# Patient Record
Sex: FEMALE | HISPANIC OR LATINO | Employment: FULL TIME | URBAN - METROPOLITAN AREA
[De-identification: names, ages, dates, MRNs, and addresses within clinical notes are randomized per-mention and may not be internally consistent; named-entity substitution may affect disease eponyms.]

---
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
## 2021-06-13 ENCOUNTER — HOSPITAL ENCOUNTER (OUTPATIENT)
Facility: MEDICAL CENTER | Age: 21
End: 2021-06-13
Attending: FAMILY MEDICINE
Payer: COMMERCIAL

## 2021-06-13 ENCOUNTER — OFFICE VISIT (OUTPATIENT)
Dept: URGENT CARE | Facility: PHYSICIAN GROUP | Age: 21
End: 2021-06-13
Payer: COMMERCIAL

## 2021-06-13 VITALS
BODY MASS INDEX: 20.77 KG/M2 | OXYGEN SATURATION: 100 % | TEMPERATURE: 98 F | HEIGHT: 61 IN | WEIGHT: 110 LBS | HEART RATE: 98 BPM | SYSTOLIC BLOOD PRESSURE: 102 MMHG | DIASTOLIC BLOOD PRESSURE: 58 MMHG | RESPIRATION RATE: 16 BRPM

## 2021-06-13 DIAGNOSIS — R43.9 DECREASED TASTE AND SMELL: ICD-10-CM

## 2021-06-13 DIAGNOSIS — Z20.822 EXPOSURE TO CONFIRMED CASE OF COVID-19: ICD-10-CM

## 2021-06-13 DIAGNOSIS — R05.9 COUGH: ICD-10-CM

## 2021-06-13 DIAGNOSIS — J34.89 RHINORRHEA: ICD-10-CM

## 2021-06-13 DIAGNOSIS — R50.9 FEVER, UNSPECIFIED FEVER CAUSE: ICD-10-CM

## 2021-06-13 DIAGNOSIS — J02.9 ACUTE PHARYNGITIS, UNSPECIFIED ETIOLOGY: ICD-10-CM

## 2021-06-13 DIAGNOSIS — Z20.822 SUSPECTED COVID-19 VIRUS INFECTION: ICD-10-CM

## 2021-06-13 LAB
INT CON NEG: NORMAL
INT CON POS: NORMAL
S PYO AG THROAT QL: NEGATIVE

## 2021-06-13 PROCEDURE — U0005 INFEC AGEN DETEC AMPLI PROBE: HCPCS

## 2021-06-13 PROCEDURE — U0003 INFECTIOUS AGENT DETECTION BY NUCLEIC ACID (DNA OR RNA); SEVERE ACUTE RESPIRATORY SYNDROME CORONAVIRUS 2 (SARS-COV-2) (CORONAVIRUS DISEASE [COVID-19]), AMPLIFIED PROBE TECHNIQUE, MAKING USE OF HIGH THROUGHPUT TECHNOLOGIES AS DESCRIBED BY CMS-2020-01-R: HCPCS

## 2021-06-13 PROCEDURE — 87880 STREP A ASSAY W/OPTIC: CPT | Performed by: FAMILY MEDICINE

## 2021-06-13 PROCEDURE — 99213 OFFICE O/P EST LOW 20 MIN: CPT | Mod: CS | Performed by: FAMILY MEDICINE

## 2021-06-13 NOTE — PROGRESS NOTES
Chief Complaint:    Chief Complaint   Patient presents with   • Other     lost of taste and smell        History of Present Illness:    Symptoms since 6/10/21. She has had subjective fever, rhinorrhea, sore throat, and non-productive cough. Today, started with loss of taste and smell. Roommate found out today he is positive for COVID-19 - he was swabbed 2 days prior. She used OTC meds for symptoms.      Past Medical History:    Past Medical History:   Diagnosis Date   • Heart murmur      Past Surgical History:    No past surgical history on file.    Social History:    Social History     Socioeconomic History   • Marital status: Single     Spouse name: Not on file   • Number of children: Not on file   • Years of education: Not on file   • Highest education level: Not on file   Occupational History   • Not on file   Tobacco Use   • Smoking status: Never Smoker   • Smokeless tobacco: Never Used   Vaping Use   • Vaping Use: Never used   Substance and Sexual Activity   • Alcohol use: No   • Drug use: No   • Sexual activity: Yes     Partners: Male     Birth control/protection: Condom   Other Topics Concern   • Behavioral problems Not Asked   • Interpersonal relationships Not Asked   • Sad or not enjoying activities Not Asked   • Suicidal thoughts Not Asked   • Poor school performance Not Asked   • Reading difficulties Not Asked   • Speech difficulties Not Asked   • Writing difficulties Not Asked   • Inadequate sleep Not Asked   • Excessive TV viewing Not Asked   • Excessive video game use Not Asked   • Inadequate exercise Not Asked   • Sports related Not Asked   • Poor diet Not Asked   • Family concerns for drug/alcohol abuse Not Asked   • Poor oral hygiene Not Asked   • Bike safety Not Asked   • Family concerns vehicle safety Not Asked   Social History Narrative   • Not on file     Social Determinants of Health     Financial Resource Strain:    • Difficulty of Paying Living Expenses:    Food Insecurity:    • Worried  "About Running Out of Food in the Last Year:    • Ran Out of Food in the Last Year:    Transportation Needs:    • Lack of Transportation (Medical):    • Lack of Transportation (Non-Medical):    Physical Activity:    • Days of Exercise per Week:    • Minutes of Exercise per Session:    Stress:    • Feeling of Stress :    Social Connections:    • Frequency of Communication with Friends and Family:    • Frequency of Social Gatherings with Friends and Family:    • Attends Denominational Services:    • Active Member of Clubs or Organizations:    • Attends Club or Organization Meetings:    • Marital Status:    Intimate Partner Violence:    • Fear of Current or Ex-Partner:    • Emotionally Abused:    • Physically Abused:    • Sexually Abused:      Family History:    Family History   Problem Relation Age of Onset   • No Known Problems Mother    • No Known Problems Brother    • Diabetes Maternal Uncle    • Diabetes Maternal Grandmother      Medications:    No current outpatient medications on file prior to visit.     No current facility-administered medications on file prior to visit.     Allergies:    No Known Allergies      Vitals:    Vitals:    06/13/21 1237   BP: 102/58   Pulse: 98   Resp: 16   Temp: 36.7 °C (98 °F)   TempSrc: Temporal   SpO2: 100%   Weight: 49.9 kg (110 lb)   Height: 1.549 m (5' 1\")       Physical Exam:    Constitutional: Vital signs reviewed. Appears well-developed and well-nourished. No acute distress.   Eyes: Sclera white, conjunctivae clear.   ENT: External ears normal. External auditory canals normal without discharge. TMs translucent and non-bulging. Hearing normal. Nasal mucosa erythematous. Lips/teeth are normal. Oral mucosa pink and moist. Posterior pharynx: mildly erythematous.  Neck: Neck supple.   Cardiovascular: Regular rate and rhythm. No murmur.  Pulmonary/Chest: Respirations non-labored. Clear to auscultation bilaterally.  Lymph: Cervical nodes without tenderness or " enlargement.  Musculoskeletal: Normal gait. Normal range of motion. No muscular atrophy or weakness.  Neurological: Alert and oriented to person, place, and time. Muscle tone normal. Coordination normal.   Skin: No rashes or lesions. Warm, dry, normal turgor.  Psychiatric: Normal mood and affect. Behavior is normal. Judgment and thought content normal.       Diagnostics:    POCT Rapid Strep A  Order: [id number]  Status:  Final result   Visible to patient:  No (scheduled for 6/14/2021 11:04 AM) Next appt:  None Dx:  Acute pharyngitis, unspecified etiology   0 Result Notes  Component  1:03 PM   Rapid Strep Screen negative    Internal Control Positive Valid    Internal Control Negative Valid    Resulting [facility]         Specimen Collected: 06/13/21  1:03 PM Last Resulted: 06/13/21  1:04 PM             Assessment / Plan:    1. Fever, unspecified fever cause  - SARS-CoV-2 PCR (24 hour In-House): Collect NP swab in VTM; Future    2. Rhinorrhea  - SARS-CoV-2 PCR (24 hour In-House): Collect NP swab in VTM; Future    3. Acute pharyngitis, unspecified etiology  - SARS-CoV-2 PCR (24 hour In-House): Collect NP swab in VTM; Future  - POCT Rapid Strep A    4. Decreased taste and smell  - SARS-CoV-2 PCR (24 hour In-House): Collect NP swab in VTM; Future    5. Cough  - SARS-CoV-2 PCR (24 hour In-House): Collect NP swab in VTM; Future    6. Suspected COVID-19 virus infection  - SARS-CoV-2 PCR (24 hour In-House): Collect NP swab in VTM; Future    7. Exposure to confirmed case of COVID-19  - SARS-CoV-2 PCR (24 hour In-House): Collect NP swab in VTM; Future      Discussed with her DDX, management options, and risks, benefits, and alternatives to treatment plan agreed upon.    Patient is clinically stable.    May use over-the-counter medications (such as cold meds) for symptoms as needed.    Agreeable to COVID-19 test obtained.    Advised test result will show in MyChart.    Patient will follow-up if needed while waiting for  test result.

## 2021-06-14 DIAGNOSIS — J02.9 ACUTE PHARYNGITIS, UNSPECIFIED ETIOLOGY: ICD-10-CM

## 2021-06-14 DIAGNOSIS — R43.9 DECREASED TASTE AND SMELL: ICD-10-CM

## 2021-06-14 DIAGNOSIS — J34.89 RHINORRHEA: ICD-10-CM

## 2021-06-14 DIAGNOSIS — Z20.822 SUSPECTED COVID-19 VIRUS INFECTION: ICD-10-CM

## 2021-06-14 DIAGNOSIS — Z20.822 EXPOSURE TO CONFIRMED CASE OF COVID-19: ICD-10-CM

## 2021-06-14 DIAGNOSIS — R05.9 COUGH: ICD-10-CM

## 2021-06-14 DIAGNOSIS — R50.9 FEVER, UNSPECIFIED FEVER CAUSE: ICD-10-CM

## 2021-06-14 LAB — COVID ORDER STATUS COVID19: NORMAL

## 2021-06-15 LAB
SARS-COV-2 RNA RESP QL NAA+PROBE: DETECTED
SPECIMEN SOURCE: ABNORMAL

## 2023-02-21 ENCOUNTER — HOSPITAL ENCOUNTER (EMERGENCY)
Facility: MEDICAL CENTER | Age: 23
End: 2023-02-21

## 2023-02-21 VITALS
OXYGEN SATURATION: 95 % | WEIGHT: 99.65 LBS | RESPIRATION RATE: 16 BRPM | SYSTOLIC BLOOD PRESSURE: 128 MMHG | HEIGHT: 61 IN | BODY MASS INDEX: 18.81 KG/M2 | DIASTOLIC BLOOD PRESSURE: 76 MMHG | TEMPERATURE: 98.6 F | HEART RATE: 77 BPM

## 2023-02-21 PROCEDURE — 302449 STATCHG TRIAGE ONLY (STATISTIC)

## 2023-02-22 NOTE — ED TRIAGE NOTES
Lo Mendoza  22 y.o.  Chief Complaint   Patient presents with    Abdominal Pain     lower     Pt ambulatory to triage with c/o low abdominal pain.  Pt reports she had this pain 6 months ago and it went away without being seen.  Pt reports she had some vaginal bleeding today and then the abdominal cramping.  Pt reports she is not on borth control, but she doesn't think she is pregnant.  Pt also reports she recently had an .  Pt rates her pain 3-4/10 at this time, but states the pain has been as high as a 9/10.

## 2024-08-14 ENCOUNTER — GYNECOLOGY VISIT (OUTPATIENT)
Dept: OBGYN | Facility: CLINIC | Age: 24
End: 2024-08-14
Payer: COMMERCIAL

## 2024-08-14 ENCOUNTER — HOSPITAL ENCOUNTER (OUTPATIENT)
Facility: MEDICAL CENTER | Age: 24
End: 2024-08-14
Attending: STUDENT IN AN ORGANIZED HEALTH CARE EDUCATION/TRAINING PROGRAM
Payer: COMMERCIAL

## 2024-08-14 VITALS
HEIGHT: 61 IN | SYSTOLIC BLOOD PRESSURE: 137 MMHG | DIASTOLIC BLOOD PRESSURE: 83 MMHG | BODY MASS INDEX: 18.88 KG/M2 | WEIGHT: 100 LBS

## 2024-08-14 DIAGNOSIS — Z30.09 BIRTH CONTROL COUNSELING: ICD-10-CM

## 2024-08-14 DIAGNOSIS — N89.8 VAGINAL DISCHARGE: ICD-10-CM

## 2024-08-14 DIAGNOSIS — Z30.46 NEXPLANON REMOVAL: ICD-10-CM

## 2024-08-14 PROCEDURE — 11982 REMOVE DRUG IMPLANT DEVICE: CPT | Performed by: STUDENT IN AN ORGANIZED HEALTH CARE EDUCATION/TRAINING PROGRAM

## 2024-08-14 PROCEDURE — 3075F SYST BP GE 130 - 139MM HG: CPT | Performed by: STUDENT IN AN ORGANIZED HEALTH CARE EDUCATION/TRAINING PROGRAM

## 2024-08-14 PROCEDURE — 3079F DIAST BP 80-89 MM HG: CPT | Performed by: STUDENT IN AN ORGANIZED HEALTH CARE EDUCATION/TRAINING PROGRAM

## 2024-08-14 PROCEDURE — 87480 CANDIDA DNA DIR PROBE: CPT

## 2024-08-14 PROCEDURE — 99202 OFFICE O/P NEW SF 15 MIN: CPT | Mod: 25 | Performed by: STUDENT IN AN ORGANIZED HEALTH CARE EDUCATION/TRAINING PROGRAM

## 2024-08-14 PROCEDURE — 87660 TRICHOMONAS VAGIN DIR PROBE: CPT

## 2024-08-14 PROCEDURE — 87510 GARDNER VAG DNA DIR PROBE: CPT

## 2024-08-14 RX ORDER — NORGESTIMATE AND ETHINYL ESTRADIOL 0.25-0.035
1 KIT ORAL DAILY
Qty: 84 TABLET | Refills: 3 | Status: SHIPPED | OUTPATIENT
Start: 2024-08-14

## 2024-08-14 RX ORDER — LEVONORGESTREL/ETHIN.ESTRADIOL 0.1-0.02MG
1 TABLET ORAL DAILY
Qty: 84 TABLET | Refills: 3 | Status: SHIPPED | OUTPATIENT
Start: 2024-08-14 | End: 2024-08-14

## 2024-08-14 ASSESSMENT — ENCOUNTER SYMPTOMS
RESPIRATORY NEGATIVE: 1
GASTROINTESTINAL NEGATIVE: 1
CONSTITUTIONAL NEGATIVE: 1

## 2024-08-14 NOTE — PROGRESS NOTES
New Gynecological Visit    Lo Mendoza    24 y.o.    Chief complaint    Chief Complaint   Patient presents with    New Patient     NEXPLANON REMOVAL       HPI    23 y/o female presents today for nexplanon removal. States that her nexplanon was placed 2023 by Dr. Peck. States that she also had her pap done at that time and it was normal. This was her first pap ever. She states that she would like her nexplanon removed due to it causing irregular/heavy bleeding. She is interested in getting back on OCPs. Was on these prior to her nexplanon and tolerated them without any side effects. No hx of migraines with aura. No hx/family hx of clotting/VTE. No tobacco use.     She is also c/o one month of vaginal discharge that she describes as brown/bloody in appearance. Denies any itching. No hx of same. Has not tried anything for this. Intermittently does notice an odor. No new partners. No lesions.         Review of Systems:  Review of Systems   Constitutional: Negative.    Respiratory: Negative.     Gastrointestinal: Negative.    Genitourinary:  Negative for dysuria, frequency, hematuria and urgency.        Vaginal discharge x 1 month. No vaginal itching. Intermittent vaginal odor. No lesions.   +irregular menstrual bleeding since placement of nexplanon   Skin: Negative.         Past Obstetrical History:          Past Gynecological History:    Last pap: 2023  H/o abnormal pap: no  Currently in a relationship: yes - with male partner  Feel safe in your current relationship: yes   Current Sexual Activity: yes   H/o STIs:  no  LMP: Patient's last menstrual period was 04/15/2024 (approximate).  Current contraception: NEXPLANON    Past Medical History    Past Medical History:   Diagnosis Date    Heart murmur        Past Surgical History    History reviewed. No pertinent surgical history.    Family History   Problem Relation Age of Onset    No Known Problems Mother     No Known Problems Brother     Diabetes  "Maternal Uncle     Diabetes Maternal Grandmother        Allergies    No Known Allergies    Medications    No current outpatient medications on file.     No current facility-administered medications for this visit.       Social  Social History     Tobacco Use    Smoking status: Never    Smokeless tobacco: Never   Vaping Use    Vaping status: Never Used   Substance Use Topics    Alcohol use: No     Comment: soc    Drug use: No        OBJECTIVE:    Vitals    /83 (BP Location: Right arm, Patient Position: Sitting, BP Cuff Size: Small adult)   Ht 5' 1\"   Wt 100 lb   LMP 04/15/2024 (Approximate)   BMI 18.89 kg/m²     Physical Exam    Constitutional: The patient is well developed and well nourished.  Psychiatric: Patient is oriented to time place and person.   Skin: No rash observed.  Neck: Appears symmetric. Thyroid normal size  Respiratory: normal effort  Breast: declined  Abdomen: Soft, non-tender.  Pelvic Exam: pt self swabbed in the clinic  Extremeties: Legs are symmetric and without tenderness. There is no edema present. Nexplanon palpable to left upper arm.       A/P    There is no problem list on file for this patient.      Lo Mendoza    24 y.o.        1. Nexplanon removal  Pt would like her nexplanon removed due to irregular/heavy bleeding since placement 2023. Please see procedure note for nexplanon removal. Pt tolerated well without any complications.   - Consent for all Surgical, Special Diagnostic or Therapeutic Procedures      2. Birth control counseling  - Birth control options discussed in detail including pill, patch, ring, shot, implant, IUD including use and adverse effects of each option.  Discussed that OCP and ring allow for scheduled monthly periods and or manipulation of cycles if desired to skip periods.  Side effect profile for each is similar. Dicussed that progesterone only options, namely shot and implant are most associated with weight gain. Discussed abnormal bleeding " associated progesterone only options, often being most common reason for stopping/removal. Patient with no history of uncontrolled HTN or DM, denies history of migraines with aura or blood clots. Discussed possible SE of any hormonal contraception may include: N/V, HA, menstrual changes, weight fluctuation, breast tenderness, abdominal pain, anxiety or depression, DVT. Counseled that contraception does not protect against STDs and condom use was recommended  - Reviewed pill initiation, importance of compliance, missed pills, common and severe side effects.          3. Vaginal discharge  1 month hx of vaginal discharge that is bloody/brown in appearance. No vaginal itching, urinary symptoms, pelvic pain, or lesions.No new sexual partners. Has nevr had this in the past. Has not tried anything for this. She self swabbed here in the clinic today. Will update with results.   - VAGINAL PATHOGENS DNA PANEL; Future        Time spent: 30 minutes        Jossy Calderon P.A.-C.    Obstetrics and Gynecology    8/14/20241:04 PM

## 2024-08-14 NOTE — PROCEDURES
"Nexplanon Removal Procedure Note    Patient presents for removal of Nexplanon, has had in place for 3 years and desires to switch to OCPs.  R/b/a discussed with patient, including pain during placement, bleeding, bruising, and side effects of OCPs.  Procedure discussed at length with patient, including use of local anesthetic.  Implant is in the patient's LEFT upper arm.      Vitals:    08/14/24 1259   BP: 137/83   BP Location: Right arm   Patient Position: Sitting   BP Cuff Size: Small adult   Weight: 100 lb   Height: 5' 1\"       The patient was positioned on the examination table with her left arm flexed at the elbow and externally rotated so that her wrist was parallel to her ear.  The Implanon was easily palpated along the medial aspect of the left upper arm and felt to be superficial.  The skin over the distal end of the implant was cleansed with betadine and 3mL of 1% lidocaine with epinephrine was injected subcutaneously.      An incision was made with scalpel and the end of the implant was visualized.  Using a forcep, the implant was grasped, dissected and removed intact. Minimal blood loss. Steristrips placed over incision and a pressure dressing was applied.   Patient tolerated the procedure well.     Jossy Calderon P.A.-C.    "

## 2024-08-15 DIAGNOSIS — B37.31 YEAST VAGINITIS: ICD-10-CM

## 2024-08-15 LAB
CANDIDA DNA VAG QL PROBE+SIG AMP: POSITIVE
G VAGINALIS DNA VAG QL PROBE+SIG AMP: NEGATIVE
T VAGINALIS DNA VAG QL PROBE+SIG AMP: NEGATIVE

## 2024-08-15 RX ORDER — FLUCONAZOLE 150 MG/1
150 TABLET ORAL
Qty: 2 TABLET | Refills: 0 | Status: SHIPPED | OUTPATIENT
Start: 2024-08-15

## 2024-08-19 RX ORDER — LEVONORGESTREL/ETHIN.ESTRADIOL 0.1-0.02MG
1 TABLET ORAL DAILY
Qty: 84 TABLET | Refills: 3 | Status: SHIPPED | OUTPATIENT
Start: 2024-08-19